# Patient Record
Sex: MALE | Race: WHITE | NOT HISPANIC OR LATINO | Employment: FULL TIME
[De-identification: names, ages, dates, MRNs, and addresses within clinical notes are randomized per-mention and may not be internally consistent; named-entity substitution may affect disease eponyms.]

---

## 2019-11-06 ENCOUNTER — HEALTH MAINTENANCE LETTER (OUTPATIENT)
Age: 24
End: 2019-11-06

## 2020-11-29 ENCOUNTER — HEALTH MAINTENANCE LETTER (OUTPATIENT)
Age: 25
End: 2020-11-29

## 2021-09-19 ENCOUNTER — HEALTH MAINTENANCE LETTER (OUTPATIENT)
Age: 26
End: 2021-09-19

## 2022-01-09 ENCOUNTER — HEALTH MAINTENANCE LETTER (OUTPATIENT)
Age: 27
End: 2022-01-09

## 2022-11-21 ENCOUNTER — HEALTH MAINTENANCE LETTER (OUTPATIENT)
Age: 27
End: 2022-11-21

## 2023-04-16 ENCOUNTER — HEALTH MAINTENANCE LETTER (OUTPATIENT)
Age: 28
End: 2023-04-16

## 2023-07-21 ENCOUNTER — APPOINTMENT (OUTPATIENT)
Dept: MRI IMAGING | Facility: CLINIC | Age: 28
End: 2023-07-21
Attending: EMERGENCY MEDICINE
Payer: COMMERCIAL

## 2023-07-21 ENCOUNTER — HOSPITAL ENCOUNTER (EMERGENCY)
Facility: CLINIC | Age: 28
Discharge: HOME | End: 2023-07-21
Attending: EMERGENCY MEDICINE | Admitting: EMERGENCY MEDICINE
Payer: COMMERCIAL

## 2023-07-21 VITALS
SYSTOLIC BLOOD PRESSURE: 122 MMHG | OXYGEN SATURATION: 96 % | HEART RATE: 62 BPM | DIASTOLIC BLOOD PRESSURE: 83 MMHG | BODY MASS INDEX: 37.03 KG/M2 | WEIGHT: 250 LBS | HEIGHT: 69 IN | TEMPERATURE: 98.8 F | RESPIRATION RATE: 16 BRPM

## 2023-07-21 DIAGNOSIS — M51.369 DDD (DEGENERATIVE DISC DISEASE), LUMBAR: ICD-10-CM

## 2023-07-21 DIAGNOSIS — M51.26 HNP (HERNIATED NUCLEUS PULPOSUS), LUMBAR: ICD-10-CM

## 2023-07-21 DIAGNOSIS — M54.41 ACUTE RIGHT-SIDED LOW BACK PAIN WITH RIGHT-SIDED SCIATICA: ICD-10-CM

## 2023-07-21 PROCEDURE — 250N000013 HC RX MED GY IP 250 OP 250 PS 637: Performed by: EMERGENCY MEDICINE

## 2023-07-21 PROCEDURE — 99284 EMERGENCY DEPT VISIT MOD MDM: CPT | Mod: 25 | Performed by: EMERGENCY MEDICINE

## 2023-07-21 PROCEDURE — 99284 EMERGENCY DEPT VISIT MOD MDM: CPT | Performed by: EMERGENCY MEDICINE

## 2023-07-21 PROCEDURE — 72148 MRI LUMBAR SPINE W/O DYE: CPT

## 2023-07-21 PROCEDURE — 250N000012 HC RX MED GY IP 250 OP 636 PS 637: Performed by: EMERGENCY MEDICINE

## 2023-07-21 RX ORDER — METHYLPREDNISOLONE 4 MG
TABLET, DOSE PACK ORAL
Qty: 21 TABLET | Refills: 0 | Status: SHIPPED | OUTPATIENT
Start: 2023-07-21

## 2023-07-21 RX ORDER — PREDNISONE 20 MG/1
TABLET ORAL
Qty: 10 TABLET | Refills: 0 | Status: SHIPPED | OUTPATIENT
Start: 2023-07-21

## 2023-07-21 RX ORDER — PREDNISONE 20 MG/1
40 TABLET ORAL ONCE
Status: COMPLETED | OUTPATIENT
Start: 2023-07-21 | End: 2023-07-21

## 2023-07-21 RX ORDER — PREDNISONE 20 MG/1
40 TABLET ORAL ONCE
Status: CANCELLED | OUTPATIENT
Start: 2023-07-21 | End: 2023-07-21

## 2023-07-21 RX ORDER — CYCLOBENZAPRINE HCL 10 MG
10 TABLET ORAL 3 TIMES DAILY PRN
Qty: 30 TABLET | Refills: 0 | Status: SHIPPED | OUTPATIENT
Start: 2023-07-21

## 2023-07-21 RX ORDER — ACETAMINOPHEN 325 MG/1
975 TABLET ORAL ONCE
Status: COMPLETED | OUTPATIENT
Start: 2023-07-21 | End: 2023-07-21

## 2023-07-21 RX ORDER — CYCLOBENZAPRINE HCL 10 MG
10 TABLET ORAL 3 TIMES DAILY PRN
Qty: 30 TABLET | Refills: 1 | Status: SHIPPED | OUTPATIENT
Start: 2023-07-21

## 2023-07-21 RX ADMIN — ACETAMINOPHEN 975 MG: 325 TABLET, FILM COATED ORAL at 20:02

## 2023-07-21 RX ADMIN — PREDNISONE 40 MG: 20 TABLET ORAL at 23:11

## 2023-07-21 ASSESSMENT — ACTIVITIES OF DAILY LIVING (ADL)
ADLS_ACUITY_SCORE: 33
ADLS_ACUITY_SCORE: 35
ADLS_ACUITY_SCORE: 33

## 2023-07-21 NOTE — ED TRIAGE NOTES
States having on and off back pain. Went to the chiropractor and after adjustment now having a lot of pain lower back

## 2023-07-22 NOTE — ED NOTES
Pt states he took ibuprofen at 1600, declined tylenol, states he can bear weight on L leg, unable to bear weight on R leg.

## 2023-07-22 NOTE — ED PROVIDER NOTES
History     Chief Complaint   Patient presents with     Back Pain     HPI   History per patient, review of [facility] EMR and Care Everywhere EMR, including review of prior clinic notes, CT spine imaging evaluation and laboratory evaluation.  Hector Rendon is a 28 year old male with chronic LBP with acute severe LBP after chiropractic care this afternoon, while in the chiropractor's office after manipulation.  He has had several days of atraumatic LBP consistent with prior frequent acute flares of low back pain, prompting him to seek chiropractic care on 2 occasions this past week.  He currently has sharp severe right low back pain limiting range of motion and ability to ambulate with radiation of pain into the right hip area and leg to the level of the knee but no further.  No leg weakness but is unable to use the right leg normally due to severe back pain caused by movement of the right leg and hip.  No bowel or bladder incontinence.  No flank pain or abdominal pain.  No fever or chills.  No other pertinent history or acute complaints or concerns.      Allergies:  Allergies   Allergen Reactions     Keflex [Cephalexin Monohydrate]        Problem List:    Patient Active Problem List    Diagnosis Date Noted     Marijuana smoker 04/11/2013     Priority: Medium     Acute ulcerative gingivitis 08/07/2008     Priority: Medium     on left lower gum - herpes? send cx.  1st episode.         Family history of ischemic heart disease 08/07/2008     Priority: Medium      - check lipids       Bronchospasm 08/07/2008     Priority: Medium     episode waking him up - chest heaviness w exercise - methacholine challenge test - hold on sports for now - consider cotnroller med pending tests       Health Care Home 04/10/2013     Priority: Low     EMERGENCY CARE PLAN  April 10, 2013: No current Care Coordination follow up planned. Please refer if Care Coordination services are needed.    Presenting Problem Signs and Symptoms Treatment  Plan   Questions or concerns   during clinic hours   I will call my clinic directly:  [facility]  [address] [place] [address]  [phone].    Questions or concerns outside clinic hours   I will call the 24 hour nurse line at   [phone] or [phone].   Need to schedule an appointment   I will call the 24 hour scheduling team at [phone] or my clinic directly at [phone].    Same day treatment     I will call my clinic first, nurse line if after hours, urgent care and express care if needed.   Clinic care coordination services (regular clinic hours)     I will call a clinic care coordinator directly:     Jensen Mae RN  Mon, Tues, Fri - [phone]  Wed, Thurs - [phone]    Montse Heath :    [phone]    Or call my clinic at [phone] and ask to speak with care coordination.   Crisis Services: Behavioral or Mental Health  [facility] 24 Hour Phone Line  [phone]    [facility] 24 Hour Crisis Services  [phone]    [facility] ([facility]) Network [phone]    [facility]   [phone]       Emergency treatment -- Immediately    CAll 205             Past Medical History:    No past medical history on file.    Past Surgical History:    Past Surgical History:   Procedure Laterality Date     DENTAL SURGERY      [place] teeth removal     Left little finger      Wood splitter accident       Family History:    Family History   Problem Relation Age of Onset     Hypertension Father      Cancer Maternal Grandmother         Lung     Cancer - colorectal Maternal Grandfather          70's     Heart Disease Maternal Grandfather          70's     Cerebrovascular Disease Paternal Grandmother         Lymphoma     Lipids Mother      Hypertension Mother      Heart Disease Other         MI at 46 yo.  mggf     Heart Disease Paternal Grandfather         74 yo heart       Social History:  Marital Status:  Single [1]  Social History  "    Tobacco Use     Smoking status: Every Day     Packs/day: 0.25     Years: 5.00     Pack years: 1.25     Types: Cigarettes     Smokeless tobacco: Never   Substance Use Topics     Alcohol use: No     Drug use: Yes     Comment: Marijuana        Medications:    cyclobenzaprine (FLEXERIL) 10 MG tablet  cyclobenzaprine (FLEXERIL) 10 MG tablet  methylPREDNISolone (MEDROL DOSEPAK) 4 MG tablet therapy pack  predniSONE (DELTASONE) 20 MG tablet  NO ACTIVE MEDICATIONS        Review of Systems  As mentioned in the HPI, in addition focused review of systems was negative.    Physical Exam   BP: (!) 150/96  Pulse: 81  Temp: 98.8  F (37.1  C)  Resp: 16  Height: 175.3 cm (5' 9\")  Weight: 113.4 kg (250 lb)  SpO2: 98 %      Physical Exam  Vitals and nursing note reviewed.   Constitutional:       General: He is in acute distress.      Appearance: Normal appearance. He is well-developed. He is not ill-appearing or diaphoretic.   HENT:      Head: Normocephalic and atraumatic.   Eyes:      General: No scleral icterus.     Extraocular Movements: Extraocular movements intact.      Conjunctiva/sclera: Conjunctivae normal.   Neck:      Trachea: No tracheal deviation.   Cardiovascular:      Rate and Rhythm: Normal rate and regular rhythm.      Pulses: Normal pulses.   Pulmonary:      Effort: Pulmonary effort is normal. No respiratory distress.   Abdominal:      General: There is no distension.      Tenderness: There is no abdominal tenderness. There is no right CVA tenderness or left CVA tenderness.   Musculoskeletal:         General: Tenderness present. No swelling. Injury:  right lumbar back.      Cervical back: Normal, normal range of motion and neck supple. No tenderness.      Thoracic back: Normal. No bony tenderness.      Lumbar back: Spasms and tenderness present. No swelling, edema or bony tenderness. Decreased range of motion.        Back:       Right lower leg: No edema.      Left lower leg: No edema.   Skin:     General: Skin is " warm and dry.      Coloration: Skin is not pale.      Findings: No erythema or rash.   Neurological:      General: No focal deficit present.      Mental Status: He is alert and oriented to person, place, and time.      Sensory: Sensation is intact. No sensory deficit.      Motor: Motor function is intact. No weakness.      Coordination: Coordination is intact.      Deep Tendon Reflexes: Reflexes are normal and symmetric. Reflexes normal.   Psychiatric:         Mood and Affect: Mood normal.         Behavior: Behavior normal.         ED Course                 Procedures            Results for orders placed or performed during the hospital encounter of 07/21/23   MR Lumbar Spine w/o Contrast     Status: None    Narrative    EXAM: MR LUMBAR SPINE W/O CONTRAST  LOCATION: [facility]  DATE: 7/21/2023    INDICATION: Acute on chronic LBP radiating to the right leg after chiropractic care.  COMPARISON: None.  TECHNIQUE: Routine Lumbar Spine MRI without IV contrast.    FINDINGS:   Nomenclature is based on 5 lumbar type vertebral bodies. Straightening of the normal lordosis without significant spondylolisthesis. Maintained vertebral body heights. Intervertebral disc height loss and desiccation at L5-S1 with mild Modic type I   degenerative change. Mild nonspecific interspinous edema at L5-S1 and, to a lesser extent, at L3-L5. No suspicious bone marrow signal intensity. Unremarkable conus medullaris terminating at T12-L1. Unremarkable cauda equina nerve roots. No significant   abnormality within the visualized abdominopelvic cavity. Unremarkable partially imaged sacroiliac joints.    T12-L1: No significant degenerative change, spinal canal stenosis, or foraminal narrowing.     L1-L2: No significant degenerative change, spinal canal stenosis, or foraminal narrowing.    L2-L3: No significant degenerative change, spinal canal stenosis, or foraminal narrowing.     L3-L4: No significant degenerative  change, spinal canal stenosis, or foraminal narrowing.    L4-L5: Mild right facet arthrosis. No significant disc degenerative change, spinal canal stenosis, or foraminal narrowing.    L5-S1: A disc bulge with a superimposed central disc protrusion and annular fissure contacting the descending LEFT S1 nerve root without significant spinal canal stenosis. Along with mild bilateral facet arthrosis, findings contribute to mild left   foraminal narrowing. No significant right foraminal stenosis.      Impression    IMPRESSION:  1.  A disc bulge with a superimposed central disc protrusion and annular fissure at L5-S1 contacting the descending LEFT S1 nerve root without significant spinal canal stenosis.  2.  Mild left L5-S1 foraminal narrowing.  3.  Mild Modic type I degenerative change at L5-S1, which may correlate with symptoms of back pain.         Medications   HYDROmorphone (DILAUDID) injection 1 mg (1 mg Intramuscular Not Given 7/21/23 1959)   acetaminophen (TYLENOL) tablet 975 mg (975 mg Oral $Given 7/21/23 2002)   predniSONE (DELTASONE) tablet 40 mg (40 mg Oral $Given 7/21/23 2311)     Much improved after Dilaudid IM.    Assessments & Plan (with Medical Decision Making)   28 year old male with chronic LBP with acute severe LBP after chiropractic care this afternoon, while in the chiropractor's office after manipulation.  He has had several days of atraumatic LBP consistent with prior frequent acute flares of low back pain, prompting him to seek chiropractic care on 2 occasions this past week.  He currently has sharp severe right low back pain limiting range of motion and ability to ambulate with radiation of pain into the right hip area and leg to the level of the knee but no further.  No leg weakness or sensory deficit.  No evidence of cauda equina syndrome or emergent disease process. MRI lumbar spine shows a disc bulge with superimposed central disc protrusion and annular fissure at L5-S1 which contacts the  descending left, but not right, S1 nerve root without significant spinal canal stenosis.  Much improved after Dilaudid IM. He declined rx for an opiate analgesic.  I prescribed Flexeril and he will use OTC analgesics, Tylenol and ibuprofen.  He wished to try prednisone and he was given a dose prior to discharge with Rx for Medrol Dosepak.  I will make an Orthopedic/Spine  referral for his follow-up and have him follow-up in primary care clinic.  He was provided instructions for supportive care and will return as needed for worsened condition or worsening symptoms, or new problems or concerns.    I have reviewed the nursing notes.    I have reviewed the findings, diagnosis, plan and need for follow up with the patient.    Medical Decision Making: Moderate complexity.      Discharge Medication List as of 7/21/2023 11:04 PM      START taking these medications    Details   !! cyclobenzaprine (FLEXERIL) 10 MG tablet Take 1 tablet (10 mg) by mouth 3 times daily as needed for muscle spasms, Disp-30 tablet, R-0, E-Prescribe      !! cyclobenzaprine (FLEXERIL) 10 MG tablet Take 1 tablet (10 mg) by mouth 3 times daily as needed for muscle spasms, Disp-30 tablet, R-1, E-Prescribe      methylPREDNISolone (MEDROL DOSEPAK) 4 MG tablet therapy pack Follow Package Directions, Disp-21 tablet, R-0, E-Prescribe      predniSONE (DELTASONE) 20 MG tablet Take two tablets (40mg) daily for 6 days, Disp-10 tablet, R-0, E-Prescribe       !! - Potential duplicate medications found. Please discuss with provider.          Final diagnoses:   Acute right-sided low back pain with right-sided sciatica   DDD (degenerative disc disease), lumbar   HNP (herniated nucleus pulposus), lumbar - L5-S1       7/21/2023   [facility] EMERGENCY DEPT       Robb Sunshine MD  07/24/23 2129

## 2023-07-25 ENCOUNTER — OFFICE VISIT (OUTPATIENT)
Dept: PHYSICAL MEDICINE AND REHAB | Facility: CLINIC | Age: 28
End: 2023-07-25
Attending: EMERGENCY MEDICINE
Payer: COMMERCIAL

## 2023-07-25 VITALS
HEART RATE: 83 BPM | BODY MASS INDEX: 37.03 KG/M2 | HEIGHT: 69 IN | SYSTOLIC BLOOD PRESSURE: 145 MMHG | DIASTOLIC BLOOD PRESSURE: 87 MMHG | WEIGHT: 250 LBS

## 2023-07-25 DIAGNOSIS — M51.26 HNP (HERNIATED NUCLEUS PULPOSUS), LUMBAR: ICD-10-CM

## 2023-07-25 DIAGNOSIS — M51.369 DDD (DEGENERATIVE DISC DISEASE), LUMBAR: ICD-10-CM

## 2023-07-25 DIAGNOSIS — M54.50 ACUTE BILATERAL LOW BACK PAIN WITHOUT SCIATICA: Primary | ICD-10-CM

## 2023-07-25 DIAGNOSIS — M54.41 ACUTE RIGHT-SIDED LOW BACK PAIN WITH RIGHT-SIDED SCIATICA: ICD-10-CM

## 2023-07-25 PROCEDURE — 99204 OFFICE O/P NEW MOD 45 MIN: CPT | Performed by: NURSE PRACTITIONER

## 2023-07-25 ASSESSMENT — PAIN SCALES - GENERAL: PAINLEVEL: MODERATE PAIN (5)

## 2023-07-25 NOTE — PROGRESS NOTES
ASSESSMENT: Hector Rendon is a 28 year old male who presents for consultation at the request of PCP Clinic, [facility], who presents today for new patient evaluation of:    -low back pain     Patient is neurologically intact on exam. No myelopathic or red flag symptoms.  He has no consistent leg symptoms at this time, however leg extension does worsen his back pain.  Recommended starting a course of physical therapy.  Recommended smoking cessation.  Recommended continuing and finishing his previously prescribed course of prednisone from the ER.  Recommended continuing his 3 times daily Flexeril 10 mg.  Recommended avoidance of exacerbating activities until symptoms begin to improve.  I will ask one of the nurses to reach out to him in several days to see how he is doing since he is scheduled to return to work 6 days from now.  Would consider a lifting restriction of no more than 10 pounds if still having difficulty with physical activity at that point.  If he continues to slowly improve, recommend next follow-up appointment in approximately 8 weeks after finished with full course of physical therapy.  If symptoms worsen in the meantime, recommended he call our nurse line.  We reviewed red flag symptoms for which he should call us.         No data to display                     Diagnoses and all orders for this visit:  Acute bilateral low back pain without sciatica  -     Physical Therapy Referral; Future  Acute right-sided low back pain with right-sided sciatica  -     Spine  Referral  DDD (degenerative disc disease), lumbar  -     Spine  Referral  HNP (herniated nucleus pulposus), lumbar  Comments:  L5-S1  Orders:  -     Spine  Referral      PLAN:  Reviewed spine anatomy and disease process. Discussed diagnosis and treatment options with the patient today. A shared decision making model was used.  The patient's values and choices were respected. The following represents what was  discussed and decided upon by the provider and the patient.      -DIAGNOSTIC TESTS:  Images were personally reviewed and interpreted and explained to patient today using a spine model.   --I did not order any additional diagnostic testing today    -PHYSICAL THERAPY:   -I referred Hector to physical therapy today  -I recommended he ask his chiropractor for their opinion on whether to continue care given his MRI results. I do not see any contraindications to continuing it at this time.  Discussed the importance of core strengthening, ROM, stretching exercises with the patient and how each of these entities is important in decreasing pain.  Explained to the patient that the purpose of physical therapy is to teach the patient a home exercise program.  These exercises need to be performed every day in order to decrease pain and prevent future occurrences of pain.        -MEDICATIONS:    -Recommended he continue and finish his course of prednisone 40 mg daily.  Recommend he continue his Flexeril 10 mg 3 times daily as needed.  Recommended over-the-counter Tylenol as needed as directed.  -If patient has residual back pain after completion of course of steroids, I would consider prescribing ibuprofen for him to continue on an as-needed basis for pain control.  -We could trial a different muscle relaxer if he feels he has little benefit from Flexeril.  We talked about switching his muscle relaxer as soon as today, but patient preferred to continue on his current Flexeril.  -I do not feel a neuropathic agent would benefit his pain at this point, given that he has no consistent leg symptoms.  -Of note, duplicate prescriptions for Flexeril and what seems like an erroneous entry for Medrol Dosepak are in the chart for his recent ER visit.  He only picked up 1 prescription of Flexeril and prescription for prednisone.  Discussed multiple medication options today with patient. Discussed risks, side effects, and proper use of  medications. Patient verbalized understanding.    -INTERVENTIONS:  Discussed risks and benefits of injections with patient today.  He would be a good candidate for a lumbar epidural steroid injection if he fails to respond to treatment with physical therapy and oral medications.    -PATIENT EDUCATION:  Total time of 30 minutes, on the day of service, spent with the patient, reviewing the chart, placing orders, and documenting.   -Today we also discussed the pros and cons of the current treatment plan.    -FOLLOW-UP:   Follow-up nurse telephone call this Friday.  If overall improving, would see patient back in approximately 8 weeks after completion of full course of physical therapy    Advised patient to call the [facility] if symptoms worsen, new numbness or weakness develops in the legs, or if they develop new or worsening problems controlling bladder or bowel function.   ______________________________________________________________________    SUBJECTIVE:    HPI:  Hector Rendon  Is a 28 year old male who presents today for new patient evaluation of low back pain.    Patient endorses a history of lower back pain for many years.  Symptoms have historically flared up from time to time, and resolved with chiropractic adjustments.  Last week, his back pain flared up and he pursued chiropractic treatment per usual.  He went in on Wednesday to get an adjustment and was feeling better.  Then on Thursday, he felt another twinge of pain and booked another appointment for Friday.  Meanwhile he was taking over-the-counter Advil 600 mg every 6 hours and Tylenol. He went to the chiropractor on Friday, and his back pain started immediately following adjustment. He apparently had to be carried to his truck.  The pain was sharp, radiated straight across his lower back. He couldn't walk, move, or stand up. He drove home, he got home and called his parents and was driven directly to the ED.    Once at the hospital.  He was  given IV Dilaudid.  An MRI of his lumbar spine was performed.  He was told he had a lumbar disc herniation and degenerative disc disease.  He was prescribed cyclobenzaprine and prednisone.  Since going to the ER, he has taken 1 dose of cyclobenzaprine per day.  It has not seemed very helpful.  He picked up the prednisone prescription yesterday and took 1 dose yesterday, which did not seem to help.  Today, he is feeling somewhat better. He is finally able to walk.  He is able to go up and down stairs, but it is painful. Transitions from lying to sitting to standing are still difficult.  He feels unable to stand up straight.  He is wearing a lumbar support belt that he got on [employer], which seems to help.     Still, if he twists funny or lifts something in an awkward position, he gets lightning pain across the lower back and down the backs of both legs to the knees, and he then goes down to the ground.  He does not have leg pain otherwise.    Pain today is a 7 out of 10, pain at best is 3 out of 10, pain at worst is 10 out of 10.  He has not taken the Flexeril or prednisone yet today.     -Treatment to Date:     -Medications:  -flexeril  -Prednisone  -Over-the-counter ibuprofen  -Over-the-counter Tylenol    Current Outpatient Medications   Medication    cyclobenzaprine (FLEXERIL) 10 MG tablet    predniSONE (DELTASONE) 20 MG tablet    cyclobenzaprine (FLEXERIL) 10 MG tablet    methylPREDNISolone (MEDROL DOSEPAK) 4 MG tablet therapy pack    NO ACTIVE MEDICATIONS     No current facility-administered medications for this visit.       Allergies   Allergen Reactions    Keflex [Cephalexin Monohydrate]        No past medical history on file.     Patient Active Problem List   Diagnosis    Acute ulcerative gingivitis    Family history of ischemic heart disease    Bronchospasm    Health Care Home    Marijuana smoker       Past Surgical History:   Procedure Laterality Date    DENTAL SURGERY      [place] teeth removal    Left little  "finger      Wood splitter accident       Family History   Problem Relation Age of Onset    Hypertension Father     Cancer Maternal Grandmother         Lung    Cancer - colorectal Maternal Grandfather          70's    Heart Disease Maternal Grandfather          70's    Cerebrovascular Disease Paternal Grandmother         Lymphoma    Lipids Mother     Hypertension Mother     Heart Disease Other         MI at 46 yo.  mggf    Heart Disease Paternal Grandfather         76 yo heart       Reviewed past medical, surgical, and family history with patient found on new patient intake packet located in EMR Media tab.     SOCIAL HX: Smoker, chewing tobacco, no alcohol use, no recreational drug use.    ROS: Positive for joint pain, muscle pain, muscle fatigue.  Specifically negative for bowel/bladder dysfunction, balance changes, headache, dizziness, foot drop, fevers, chills, appetite changes, nausea/vomiting, unexplained weight loss. Otherwise 13 systems reviewed are negative. Please see the patient's intake questionnaire from today for details.    OBJECTIVE:  BP (!) 145/87   Pulse 83   Ht 5' 9\" (1.753 m)   Wt 250 lb (113.4 kg)   BMI 36.92 kg/m      PHYSICAL EXAMINATION:    --CONSTITUTIONAL:  Vital signs as above.  No acute distress.  The patient is well nourished and well groomed.  --PSYCHIATRIC:  Appropriate mood and affect. The patient is awake, alert, oriented to person, place, time and answering questions appropriately with clear speech.    --SKIN:  Skin over the face, bilateral lower extremities, and posterior torso is clean, dry, intact without rashes.    --RESPIRATORY: Normal rhythm and effort. No abnormal accessory muscle breathing patterns noted.   --ABDOMINAL:  Non-distended.    --GROSS MOTOR: Gait is stiff and careful.  Painfully arises from a seated position. Toe walking and heel walking are normal without significant difficulty.  Tandem gait is normal     --LOWER EXTREMITY MOTOR TESTING:  Hip flexion: " right 5/5, left 5/5  Quads: right 5/5, left 5/5  Hamstrings: right 5/5, left 5/5  Dorsiflexion: right 5/5, left 5/5  Plantar flexion: right 5/5, left 5/5    Great toe MTP extension/EHL: right 5/5, left 5/5    --NEUROLOGICAL:  2/4 patellar and achilles reflexes bilaterally. Sensation to light touch is intact throughout both lower extremities. Babinski is negative. No clonus.    --STANDING EXAMINATION:  Normal lumbar lordosis noted, no lateral shift.    --MUSCULOSKELETAL: Lumbar spine inspection reveals no evidence of deformity. Range of motion is limited in lumbar flexion, extension, and lateral rotation due to pain. No point tenderness to palpation lumbar spine. No paraspinal musculature tenderness. Straight leg raising is positive bilaterally    --HIPS: Full range of motion bilaterally.  Negative hip joint tenderness to palp.    --SACROILIAC JOINT: No tenderness to palpation of SI joints.  One finger point test was negative.    --VASCULAR:  2/4 dorsalis pedis and posterior tibialsi pulses bilaterally.  Bilateral lower extremities are warm without any discoloration.  There is no pitting edema of the bilateral lower extremities.    RESULTS:   Prior medical records from [facility] Everywhere were reviewed today.    Imaging: Spine imaging was personally reviewed and interpreted today. The images were shown to the patient and the findings were explained using a spine model.      MR Lumbar Spine w/o Contrast    Result Date: 7/21/2023  EXAM: MR LUMBAR SPINE W/O CONTRAST LOCATION: [facility] DATE: 7/21/2023 INDICATION: Acute on chronic LBP radiating to the right leg after chiropractic care. COMPARISON: None. TECHNIQUE: Routine Lumbar Spine MRI without IV contrast. FINDINGS: Nomenclature is based on 5 lumbar type vertebral bodies. Straightening of the normal lordosis without significant spondylolisthesis. Maintained vertebral body heights. Intervertebral disc height loss and  desiccation at L5-S1 with mild Modic type I degenerative change. Mild nonspecific interspinous edema at L5-S1 and, to a lesser extent, at L3-L5. No suspicious bone marrow signal intensity. Unremarkable conus medullaris terminating at T12-L1. Unremarkable cauda equina nerve roots. No significant abnormality within the visualized abdominopelvic cavity. Unremarkable partially imaged sacroiliac joints. T12-L1: No significant degenerative change, spinal canal stenosis, or foraminal narrowing. L1-L2: No significant degenerative change, spinal canal stenosis, or foraminal narrowing. L2-L3: No significant degenerative change, spinal canal stenosis, or foraminal narrowing. L3-L4: No significant degenerative change, spinal canal stenosis, or foraminal narrowing. L4-L5: Mild right facet arthrosis. No significant disc degenerative change, spinal canal stenosis, or foraminal narrowing. L5-S1: A disc bulge with a superimposed central disc protrusion and annular fissure contacting the descending LEFT S1 nerve root without significant spinal canal stenosis. Along with mild bilateral facet arthrosis, findings contribute to mild left foraminal narrowing. No significant right foraminal stenosis.     IMPRESSION: 1.  A disc bulge with a superimposed central disc protrusion and annular fissure at L5-S1 contacting the descending LEFT S1 nerve root without significant spinal canal stenosis. 2.  Mild left L5-S1 foraminal narrowing. 3.  Mild Modic type I degenerative change at L5-S1, which may correlate with symptoms of back pain.          Aruna Jackson FNP-C  [facility]  O. [phone]

## 2023-07-25 NOTE — LETTER
7/25/2023         RE: Hector Rendon  [address]  [facility] [address]        Dear Colleague,    Thank you for referring your patient, Hector Rendon, to the [facility]. Please see a copy of my visit note below.    ASSESSMENT: Hector Rendon is a 28 year old male who presents for consultation at the request of PCP Clinic, [facility], who presents today for new patient evaluation of:    -low back pain     Patient is neurologically intact on exam. No myelopathic or red flag symptoms.  He has no consistent leg symptoms at this time, however leg extension does worsen his back pain.  Recommended starting a course of physical therapy.  Recommended smoking cessation.  Recommended continuing and finishing his previously prescribed course of prednisone from the ER.  Recommended continuing his 3 times daily Flexeril 10 mg.  Recommended avoidance of exacerbating activities until symptoms begin to improve.  I will ask one of the nurses to reach out to him in several days to see how he is doing since he is scheduled to return to work 6 days from now.  Would consider a lifting restriction of no more than 10 pounds if still having difficulty with physical activity at that point.  If he continues to slowly improve, recommend next follow-up appointment in approximately 8 weeks after finished with full course of physical therapy.  If symptoms worsen in the meantime, recommended he call our nurse line.  We reviewed red flag symptoms for which he should call us.         No data to display                     Diagnoses and all orders for this visit:  Acute bilateral low back pain without sciatica  -     Physical Therapy Referral; Future  Acute right-sided low back pain with right-sided sciatica  -     Spine  Referral  DDD (degenerative disc disease), lumbar  -     Spine  Referral  HNP (herniated nucleus pulposus), lumbar  Comments:  L5-S1  Orders:  -     Spine   Referral      PLAN:  Reviewed spine anatomy and disease process. Discussed diagnosis and treatment options with the patient today. A shared decision making model was used.  The patient's values and choices were respected. The following represents what was discussed and decided upon by the provider and the patient.      -DIAGNOSTIC TESTS:  Images were personally reviewed and interpreted and explained to patient today using a spine model.   --I did not order any additional diagnostic testing today    -PHYSICAL THERAPY:   -I referred Hector to physical therapy today  -I recommended he ask his chiropractor for their opinion on whether to continue care given his MRI results. I do not see any contraindications to continuing it at this time.  Discussed the importance of core strengthening, ROM, stretching exercises with the patient and how each of these entities is important in decreasing pain.  Explained to the patient that the purpose of physical therapy is to teach the patient a home exercise program.  These exercises need to be performed every day in order to decrease pain and prevent future occurrences of pain.        -MEDICATIONS:    -Recommended he continue and finish his course of prednisone 40 mg daily.  Recommend he continue his Flexeril 10 mg 3 times daily as needed.  Recommended over-the-counter Tylenol as needed as directed.  -If patient has residual back pain after completion of course of steroids, I would consider prescribing ibuprofen for him to continue on an as-needed basis for pain control.  -We could trial a different muscle relaxer if he feels he has little benefit from Flexeril.  We talked about switching his muscle relaxer as soon as today, but patient preferred to continue on his current Flexeril.  -I do not feel a neuropathic agent would benefit his pain at this point, given that he has no consistent leg symptoms.  -Of note, duplicate prescriptions for Flexeril and what seems like an  erroneous entry for Medrol Dosepak are in the chart for his recent ER visit.  He only picked up 1 prescription of Flexeril and prescription for prednisone.  Discussed multiple medication options today with patient. Discussed risks, side effects, and proper use of medications. Patient verbalized understanding.    -INTERVENTIONS:  Discussed risks and benefits of injections with patient today.  He would be a good candidate for a lumbar epidural steroid injection if he fails to respond to treatment with physical therapy and oral medications.    -PATIENT EDUCATION:  Total time of 30 minutes, on the day of service, spent with the patient, reviewing the chart, placing orders, and documenting.   -Today we also discussed the pros and cons of the current treatment plan.    -FOLLOW-UP:   Follow-up nurse telephone call this Friday.  If overall improving, would see patient back in approximately 8 weeks after completion of full course of physical therapy    Advised patient to call the Spine Center if symptoms worsen, new numbness or weakness develops in the legs, or if they develop new or worsening problems controlling bladder or bowel function.   ______________________________________________________________________    SUBJECTIVE:    HPI:  Hector Rendon  Is a 28 year old male who presents today for new patient evaluation of low back pain.    Patient endorses a history of lower back pain for many years.  Symptoms have historically flared up from time to time, and resolved with chiropractic adjustments.  Last week, his back pain flared up and he pursued chiropractic treatment per usual.  He went in on Wednesday to get an adjustment and was feeling better.  Then on Thursday, he felt another twinge of pain and booked another appointment for Friday.  Meanwhile he was taking over-the-counter Advil 600 mg every 6 hours and Tylenol. He went to the chiropractor on Friday, and his back pain started immediately following adjustment. He  apparently had to be carried to his truck.  The pain was sharp, radiated straight across his lower back. He couldn't walk, move, or stand up. He drove home, he got home and called his parents and was driven directly to the ED.    Once at the hospital.  He was given IV Dilaudid.  An MRI of his lumbar spine was performed.  He was told he had a lumbar disc herniation and degenerative disc disease.  He was prescribed cyclobenzaprine and prednisone.  Since going to the ER, he has taken 1 dose of cyclobenzaprine per day.  It has not seemed very helpful.  He picked up the prednisone prescription yesterday and took 1 dose yesterday, which did not seem to help.  Today, he is feeling somewhat better. He is finally able to walk.  He is able to go up and down stairs, but it is painful. Transitions from lying to sitting to standing are still difficult.  He feels unable to stand up straight.  He is wearing a lumbar support belt that he got on [employer], which seems to help.     Still, if he twists funny or lifts something in an awkward position, he gets lightning pain across the lower back and down the backs of both legs to the knees, and he then goes down to the ground.  He does not have leg pain otherwise.    Pain today is a 7 out of 10, pain at best is 3 out of 10, pain at worst is 10 out of 10.  He has not taken the Flexeril or prednisone yet today.     -Treatment to Date:     -Medications:  -flexeril  -Prednisone  -Over-the-counter ibuprofen  -Over-the-counter Tylenol    Current Outpatient Medications   Medication     cyclobenzaprine (FLEXERIL) 10 MG tablet     predniSONE (DELTASONE) 20 MG tablet     cyclobenzaprine (FLEXERIL) 10 MG tablet     methylPREDNISolone (MEDROL DOSEPAK) 4 MG tablet therapy pack     NO ACTIVE MEDICATIONS     No current facility-administered medications for this visit.       Allergies   Allergen Reactions     Keflex [Cephalexin Monohydrate]        No past medical history on file.     Patient Active  "Problem List   Diagnosis     Acute ulcerative gingivitis     Family history of ischemic heart disease     Bronchospasm     Health Care Home     Marijuana smoker       Past Surgical History:   Procedure Laterality Date     DENTAL SURGERY      [place] teeth removal     Left little finger      Wood splitter accident       Family History   Problem Relation Age of Onset     Hypertension Father      Cancer Maternal Grandmother         Lung     Cancer - colorectal Maternal Grandfather          70's     Heart Disease Maternal Grandfather          70's     Cerebrovascular Disease Paternal Grandmother         Lymphoma     Lipids Mother      Hypertension Mother      Heart Disease Other         MI at 44 yo.  mggf     Heart Disease Paternal Grandfather         76 yo heart       Reviewed past medical, surgical, and family history with patient found on new patient intake packet located in EMR Media tab.     SOCIAL HX: Smoker, chewing tobacco, no alcohol use, no recreational drug use.    ROS: Positive for joint pain, muscle pain, muscle fatigue.  Specifically negative for bowel/bladder dysfunction, balance changes, headache, dizziness, foot drop, fevers, chills, appetite changes, nausea/vomiting, unexplained weight loss. Otherwise 13 systems reviewed are negative. Please see the patient's intake questionnaire from today for details.    OBJECTIVE:  BP (!) 145/87   Pulse 83   Ht 5' 9\" (1.753 m)   Wt 250 lb (113.4 kg)   BMI 36.92 kg/m      PHYSICAL EXAMINATION:    --CONSTITUTIONAL:  Vital signs as above.  No acute distress.  The patient is well nourished and well groomed.  --PSYCHIATRIC:  Appropriate mood and affect. The patient is awake, alert, oriented to person, place, time and answering questions appropriately with clear speech.    --SKIN:  Skin over the face, bilateral lower extremities, and posterior torso is clean, dry, intact without rashes.    --RESPIRATORY: Normal rhythm and effort. No abnormal accessory muscle " breathing patterns noted.   --ABDOMINAL:  Non-distended.    --GROSS MOTOR: Gait is stiff and careful.  Painfully arises from a seated position. Toe walking and heel walking are normal without significant difficulty.  Tandem gait is normal     --LOWER EXTREMITY MOTOR TESTING:  Hip flexion: right 5/5, left 5/5  Quads: right 5/5, left 5/5  Hamstrings: right 5/5, left 5/5  Dorsiflexion: right 5/5, left 5/5  Plantar flexion: right 5/5, left 5/5    Great toe MTP extension/EHL: right 5/5, left 5/5    --NEUROLOGICAL:  2/4 patellar and achilles reflexes bilaterally. Sensation to light touch is intact throughout both lower extremities. Babinski is negative. No clonus.    --STANDING EXAMINATION:  Normal lumbar lordosis noted, no lateral shift.    --MUSCULOSKELETAL: Lumbar spine inspection reveals no evidence of deformity. Range of motion is limited in lumbar flexion, extension, and lateral rotation due to pain. No point tenderness to palpation lumbar spine. No paraspinal musculature tenderness. Straight leg raising is positive bilaterally    --HIPS: Full range of motion bilaterally.  Negative hip joint tenderness to palp.    --SACROILIAC JOINT: No tenderness to palpation of SI joints.  One finger point test was negative.    --VASCULAR:  2/4 dorsalis pedis and posterior tibialsi pulses bilaterally.  Bilateral lower extremities are warm without any discoloration.  There is no pitting edema of the bilateral lower extremities.    RESULTS:   Prior medical records from [facility] and [facility]where were reviewed today.    Imaging: Spine imaging was personally reviewed and interpreted today. The images were shown to the patient and the findings were explained using a spine model.      MR Lumbar Spine w/o Contrast    Result Date: 7/21/2023  EXAM: MR LUMBAR SPINE W/O CONTRAST LOCATION: [facility] DATE: 7/21/2023 INDICATION: Acute on chronic LBP radiating to the right leg after chiropractic care.  COMPARISON: None. TECHNIQUE: Routine Lumbar Spine MRI without IV contrast. FINDINGS: Nomenclature is based on 5 lumbar type vertebral bodies. Straightening of the normal lordosis without significant spondylolisthesis. Maintained vertebral body heights. Intervertebral disc height loss and desiccation at L5-S1 with mild Modic type I degenerative change. Mild nonspecific interspinous edema at L5-S1 and, to a lesser extent, at L3-L5. No suspicious bone marrow signal intensity. Unremarkable conus medullaris terminating at T12-L1. Unremarkable cauda equina nerve roots. No significant abnormality within the visualized abdominopelvic cavity. Unremarkable partially imaged sacroiliac joints. T12-L1: No significant degenerative change, spinal canal stenosis, or foraminal narrowing. L1-L2: No significant degenerative change, spinal canal stenosis, or foraminal narrowing. L2-L3: No significant degenerative change, spinal canal stenosis, or foraminal narrowing. L3-L4: No significant degenerative change, spinal canal stenosis, or foraminal narrowing. L4-L5: Mild right facet arthrosis. No significant disc degenerative change, spinal canal stenosis, or foraminal narrowing. L5-S1: A disc bulge with a superimposed central disc protrusion and annular fissure contacting the descending LEFT S1 nerve root without significant spinal canal stenosis. Along with mild bilateral facet arthrosis, findings contribute to mild left foraminal narrowing. No significant right foraminal stenosis.     IMPRESSION: 1.  A disc bulge with a superimposed central disc protrusion and annular fissure at L5-S1 contacting the descending LEFT S1 nerve root without significant spinal canal stenosis. 2.  Mild left L5-S1 foraminal narrowing. 3.  Mild Modic type I degenerative change at L5-S1, which may correlate with symptoms of back pain.          Aruna Jackson P-C  [facility]  O. [phone]             Again, thank you for allowing me to  participate in the care of your patient.        Sincerely,        BEV Pedersen CNP

## 2023-07-25 NOTE — PATIENT INSTRUCTIONS
"Continue your current prescription for prednisone until prescription is done.  Continue your current prescription for cyclobenzaprine three times daily as needed.  If your pain returns or if you still have residual pain after finishing your steroids, call our office and we will discuss a prescription NSAID, or resuming OTC ibuprofen as directed for pain as needed.    Radicular Pain    Radicular pain in either the arm or leg is usually from a bulging disc in the spine. A portion of the herniated disc may press against the nerves as the nerves exit the spine. This causes pain which is felt down the arm or leg. Other causes of radicular pain may include:  Fractures.  Heart disease.  Cancer.  An abnormal and usually degenerative state of the nervous system or nerves (neuropathy).    In most cases, radicular pain is treated without imaging unless symptoms do not start to improve. If that is the case, your provider may order a CT or MRI scan to determine the cause.     Nerves in the cervical spine (neck) may cause radicular pain into the outer shoulder and down the arm. It can spread down to the thumb and fingers. The symptoms vary depending on which nerve root has been affected. In most cases, radicular pain improves with conservative treatment such as physical therapy, cervical traction, medications, and epidural steroid injections. A program for neck rehabilitation with stretching and strengthening exercises is an important part of management. Treatment may take months, and surgery may be considered as a last resort if the symptoms do not improve.    Nerves in the lumbar spine (lower back) may cause radicular pain into the hip and down the leg. The commonly used term for this type of pain is \"sciatica\". Conservative treatment is also recommended for this problem. Most patients feel better after 2 to 4 weeks of rest and other supportive care. You should avoid bending, lifting, and all other activities which can make " your pain worse. Physical therapy, traction, medications, and epidural steroid injections can be good options to help with your recovery. A program for back injury rehabilitation with stretching and strengthening exercises is an important part of management. Surgery may be considered as a last resort if symptoms do not improve with conservative treatment.     You may take over-the-counter or prescription medicines for pain, discomfort, or fever as directed by your caregiver. Muscle relaxants may help by relieving more severe pain and spasm. Neuropathic medication (such as gabapentin, lyrica, or cymbalta) can help decrease your symptoms of nerve pain as well. Cold or massage can also give significant relief. Spinal manipulation is not recommended as it can increase the degree of disc protrusion. We do not recommend taking narcotic medication such as percocet, oxycodone, norco, dilaudid, or others unless pain is severe and not controlled with any other oral options.    Epidural steroid injections are often effective treatment for radicular pain. These injections deliver strong anti-inflammatory medicine to the area directly around the nerve root in the space between your back bones (vertebrae). Your provider can give you more information about steroid injections. These injections are most effective when given within two weeks of the onset of acute pain. You should see your provider for follow up care as recommended.       Importance of specialized Physical Therapy:     Today, we discussed the importance of core strengthening, ROM, and stretching exercises, and how each of these are key in decreasing pain.   The purpose of physical therapy is to teach patients a home exercise program individualized to them and their specific health concerns.  These exercises need to be performed every day in order to decrease pain and prevent future occurrences of pain.        ~[facility] have [facility] They will call you to schedule an appointment.      Scheduling phone number is [phone] for [facility], [place], or [place] location.  If you have not heard from the scheduling office within 2 business days, please call [phone] for ALL other locations.    Discussed the importance of core strengthening, ROM, stretching exercises and how each of these entities is important in decreasing pain and improving long term spine health.  The purpose of physical therapy is to teach you an individualized home exercise program.  These exercises need to be performed every day in order to decrease pain and prevent future occurrences of pain.                 In most cases, radicular pain is treated without imaging unless symptoms do not start to improve. If that is the case, your provider may order a CT or MRI scan to determine the cause.     SEEK IMMEDIATE MEDICAL CARE IF:  You develop increased pain, weakness, or numbness in your arm or leg.  You develop difficulty with bladder or bowel control.  You develop abdominal pain.    Document Released: 01/25/2006 Document Revised: 03/11/2013 Document Reviewed: 04/12/2010  Patient Information  2013 [employer].       A nurse will call you on FRIDAY to see how you are doing.  If you are not doing better, the nurse will relay this information so that further recommendations can be made.   Please do not hesitate to contact the clinic at [phone] if you have any questions/concerns, or any worsening of your pain prior to that time. You are also welcome to contact Aruna Jackson via [employer], but please be aware that responses to [employer] message may take 2-3 days due to the high volume of patients seen in clinic.       ~Please call our [facility] Nurse Navigation line [phone] with any questions or concerns about your treatment plan, if symptoms worsen and you would like to be seen urgently, or if you have any new or  worsening numbness, weakness, or problems controlling bladder and bowel function.  ~You are also welcome to contact Aruna Jackson via [employer], but please be aware that responses to [employer] message may take 2-3 days due to the high volume of patients seen in clinic.

## 2023-07-28 ENCOUNTER — TELEPHONE (OUTPATIENT)
Dept: PHYSICAL MEDICINE AND REHAB | Facility: CLINIC | Age: 28
End: 2023-07-28
Payer: COMMERCIAL

## 2023-07-28 NOTE — TELEPHONE ENCOUNTER
"Per request of PSP Aruna Jackson, APRN, CNP: \"Please call this patient on Friday to check and see how he's doing. He is supposed to return to work next Monday and we will decide if he feels better, or if he is going to need a work note with a 10lb lifting restriction.\"     Call placed. Left message to return call.   "

## 2023-08-10 ENCOUNTER — THERAPY VISIT (OUTPATIENT)
Dept: PHYSICAL THERAPY | Facility: CLINIC | Age: 28
End: 2023-08-10
Attending: NURSE PRACTITIONER
Payer: COMMERCIAL

## 2023-08-10 DIAGNOSIS — M54.42 ACUTE BACK PAIN WITH SCIATICA, LEFT: Primary | ICD-10-CM

## 2023-08-10 DIAGNOSIS — M54.50 ACUTE BILATERAL LOW BACK PAIN WITHOUT SCIATICA: ICD-10-CM

## 2023-08-10 PROCEDURE — 97161 PT EVAL LOW COMPLEX 20 MIN: CPT | Mod: GP | Performed by: PHYSICAL THERAPIST

## 2023-08-10 PROCEDURE — 97110 THERAPEUTIC EXERCISES: CPT | Mod: GP | Performed by: PHYSICAL THERAPIST

## 2023-08-10 NOTE — PROGRESS NOTES
PHYSICAL THERAPY EVALUATION  Type of Visit: Evaluation    See electronic medical record for Abuse and Falls Screening details.    Subjective       Presenting condition or subjective complaint: Herniated disc and prevention of future problems. Had acute LBP after chiropractic visit. Has had recurrent back pain for years. 2-3 times per year will have a flare that takes him out a few weeks at a time. Did do steroid course with improvement of symptoms.   Date of onset: 07/21/23    Relevant medical history:     Dates & types of surgery:      Prior diagnostic imaging/testing results: MRI   see chart  Prior therapy history for the same diagnosis, illness or injury: No      Prior Level of Function  Transfers:   Ambulation:   ADL:   IADL:     Living Environment  Social support: With a significant other or spouse   Type of home: [facility]   Stairs to enter the home: No       Ramp: No   Stairs inside the home: Yes 20 Is there a railing: Yes   Help at home: None  Equipment owned:       Employment: Yes [employer]/   Hobbies/Interests: Motorcycle riding    Patient goals for therapy: Prevent future pain slips and back aches    Pain assessment:  see objective     Objective   LUMBAR SPINE EVALUATION  PAIN: Pain Level at Rest: 0/10  Pain Level with Use: 3/10  Pain Location: lumbar spine and L SI, glute  INTEGUMENTARY (edema, incisions):   POSTURE:   GAIT:   Weightbearing Status:   Assistive Device(s):   Gait Deviations:   BALANCE/PROPRIOCEPTION:   WEIGHTBEARING ALIGNMENT:   NON-WEIGHTBEARING ALIGNMENT:    ROM:  Full flexion fingertips to floor, mild restriction in extension, SB fingertips to knee joint line. No radicular symptoms today recreated w/ ROM.    Hip ROM WNL.   PELVIC/SI SCREEN:   STRENGTH:  difficulty w/ TA engagement    MYOTOMES: WNL  DTR S:   CORD SIGNS:   DERMATOMES: WNL  NEURAL TENSION: Lumbar WNL  FLEXIBILITY:   LUMBAR/HIP Special Tests:    PELVIS/SI SPECIAL TESTS:   FUNCTIONAL TESTS:   PALPATION:   hypomobile L SI w/ PA mob. Increasde tone/tension L glutes  SPINAL SEGMENTAL CONCLUSIONS:       Assessment & Plan   CLINICAL IMPRESSIONS  Medical Diagnosis: Acute bilateral low back pain without sciatica (M54.50)  - Primary    Treatment Diagnosis: Low back pain   Impression/Assessment: Patient is a 28 year old male with low back pain complaints.  The following significant findings have been identified: Pain, Decreased ROM/flexibility, Decreased joint mobility, Decreased strength, and Impaired muscle performance. These impairments interfere with their ability to perform self care tasks, work tasks, recreational activities, driving , household mobility, and community mobility as compared to previous level of function.     Clinical Decision Making (Complexity):  Clinical Presentation: Stable/Uncomplicated  Clinical Presentation Rationale: based on medical and personal factors listed in PT evaluation  Clinical Decision Making (Complexity): Low complexity    PLAN OF CARE  Treatment Interventions:  Interventions: Manual Therapy, Neuromuscular Re-education, Therapeutic Activity, Therapeutic Exercise, Self-Care/Home Management    Long Term Goals     PT Goal 1  Goal Identifier: 1  Goal Description: Pt will demonstrate appropriate lifting mechanics for 20# floor to counter for work duties  Rationale: to maximize safety and independence within the home;to maximize safety and independence with performance of ADLs and functional tasks;to maximize safety and independence within the community;to maximize safety and independence with self cares  Target Date: 09/21/23      Frequency of Treatment: 1x/week  Duration of Treatment: 6 weeks    Recommended Referrals to Other Professionals:   Education Assessment:        Risks and benefits of evaluation/treatment have been explained.   Patient/Family/caregiver agrees with Plan of Care.     Evaluation Time:             Signing Clinician: Sakina Clarke, PT

## 2023-08-25 ENCOUNTER — THERAPY VISIT (OUTPATIENT)
Dept: PHYSICAL THERAPY | Facility: CLINIC | Age: 28
End: 2023-08-25
Payer: COMMERCIAL

## 2023-08-25 DIAGNOSIS — M54.42 ACUTE BACK PAIN WITH SCIATICA, LEFT: Primary | ICD-10-CM

## 2023-08-25 PROCEDURE — 97110 THERAPEUTIC EXERCISES: CPT | Mod: GP | Performed by: PHYSICAL THERAPIST

## 2023-08-31 ENCOUNTER — THERAPY VISIT (OUTPATIENT)
Dept: PHYSICAL THERAPY | Facility: CLINIC | Age: 28
End: 2023-08-31
Payer: COMMERCIAL

## 2023-08-31 DIAGNOSIS — M54.42 ACUTE BACK PAIN WITH SCIATICA, LEFT: Primary | ICD-10-CM

## 2023-08-31 PROCEDURE — 97110 THERAPEUTIC EXERCISES: CPT | Mod: GP | Performed by: PHYSICAL THERAPIST

## 2023-09-14 ENCOUNTER — THERAPY VISIT (OUTPATIENT)
Dept: PHYSICAL THERAPY | Facility: CLINIC | Age: 28
End: 2023-09-14
Payer: COMMERCIAL

## 2023-09-14 DIAGNOSIS — M54.42 ACUTE BACK PAIN WITH SCIATICA, LEFT: Primary | ICD-10-CM

## 2023-09-14 PROCEDURE — 97110 THERAPEUTIC EXERCISES: CPT | Mod: GP | Performed by: PHYSICAL THERAPIST

## 2023-09-14 PROCEDURE — 97530 THERAPEUTIC ACTIVITIES: CPT | Mod: GP | Performed by: PHYSICAL THERAPIST

## 2023-09-29 ENCOUNTER — THERAPY VISIT (OUTPATIENT)
Dept: PHYSICAL THERAPY | Facility: CLINIC | Age: 28
End: 2023-09-29
Payer: COMMERCIAL

## 2023-09-29 DIAGNOSIS — M54.42 ACUTE BACK PAIN WITH SCIATICA, LEFT: Primary | ICD-10-CM

## 2023-09-29 PROCEDURE — 97110 THERAPEUTIC EXERCISES: CPT | Mod: GP | Performed by: PHYSICAL THERAPIST

## 2023-11-28 PROBLEM — M54.42 ACUTE BACK PAIN WITH SCIATICA, LEFT: Status: RESOLVED | Noted: 2023-08-10 | Resolved: 2023-11-28

## 2023-11-28 NOTE — PROGRESS NOTES
DISCHARGE  Reason for Discharge: Patient chooses to discontinue therapy.  Patient has failed to schedule further appointments.    Equipment Issued: na    Discharge Plan: Patient to continue home program.    Referring Provider:  Aruna Jackson     09/29/23 0500   Appointment Info   Signing clinician's name / credentials Sakina Hearn PT DPT   Total/Authorized Visits 6   Visits Used 5   Medical Diagnosis Acute bilateral low back pain without sciatica (M54.50)  - Primary   PT Tx Diagnosis Low back pain   Progress Note/Certification   Onset of illness/injury or Date of Surgery 07/21/23   Therapy Frequency 1x/week   Predicted Duration 6 weeks   PT Goal 1   Goal Identifier 1   Goal Description Pt will demonstrate appropriate lifting mechanics for 20# floor to counter for work duties   Rationale to maximize safety and independence within the home;to maximize safety and independence with performance of ADLs and functional tasks;to maximize safety and independence within the community;to maximize safety and independence with self cares   Target Date 09/21/23   Subjective Report   Subjective Report Pt notes that still needing to work more on mobility, wants additional streetching to target middle back.   Objective Measures   Objective Measures Objective Measure 1   Objective Measure 1   Objective Measure Rom   Details Flexion to floor, B hamstring tightness. SB equilvalent. no restristion extension   Treatment Interventions (PT)   Interventions Therapeutic Procedure/Exercise   Therapeutic Procedure/Exercise   Therapeutic Procedures: strength, endurance, ROM, flexibillity minutes (51884) 27   Ther Proc 1 HEP   Ther Proc 1 - Details Verbal review of strenth ex, duration, frequency, parameters and performance   Ther Proc 2 Management symptoms   Ther Proc 2 - Details Discuss continued management of symptoms - utilizing strengthening vs stretching   PTRx Ther Proc 9 Prone On Elbows   PTRx Ther Proc 9 - Details verbal  review   PTRx Ther Proc 10 Prone Press Ups   PTRx Ther Proc 10 - Details HEP   PTRx Ther Proc 11 Pretzel Stretch   PTRx Ther Proc 11 - Details HEP   PTRx Ther Proc 12 Piriformis Stretch Below 90 Degrees Supine   PTRx Ther Proc 12 - Details self assisted and w/ PT assist - more stretch w/ allowing hip roll   PTRx Ther Proc 13 Supine Lumbar Hip Roll   PTRx Ther Proc 13 - Details w/ crossover legs for more stretch   Skilled Intervention HEP, guided stretching/strengthening   Patient Response/Progress focus on mobility today   PTRx Ther Proc 14 Standing Passive Shoulder Flexion   PTRx Ther Proc 14 - Details forward and lateral walk   PTRx Ther Proc 15 Lumbar Flexion Rotation   PTRx Ther Proc 15 - Details PT assisted   PTRx Ther Proc 16 Seated Hamstring Stretch   PTRx Ther Proc 16 - Details x 20s holds   Therapeutic Activity   PTRx Ther Act 1 Education Sheet General   PTRx Ther Act 1 - Details No Notes   Plan   Home program ptrx   Updates to plan of care Continue HEP, recheck 1 month   Plan for next session Recheck mobility. add strength if needed: consider lunge w/ rotation, lateral step down, hamstring reach. Continue mobility work   Total Session Time   Timed Code Treatment Minutes 27   Total Treatment Time (sum of timed and untimed services) 27

## 2024-06-23 ENCOUNTER — HEALTH MAINTENANCE LETTER (OUTPATIENT)
Age: 29
End: 2024-06-23

## 2025-01-25 ENCOUNTER — HOSPITAL ENCOUNTER (EMERGENCY)
Facility: CLINIC | Age: 30
Discharge: HOME | End: 2025-01-25
Attending: PHYSICIAN ASSISTANT | Admitting: PHYSICIAN ASSISTANT
Payer: COMMERCIAL

## 2025-01-25 ENCOUNTER — APPOINTMENT (OUTPATIENT)
Dept: GENERAL RADIOLOGY | Facility: CLINIC | Age: 30
End: 2025-01-25
Attending: PHYSICIAN ASSISTANT
Payer: COMMERCIAL

## 2025-01-25 VITALS
TEMPERATURE: 98.6 F | HEART RATE: 70 BPM | DIASTOLIC BLOOD PRESSURE: 66 MMHG | SYSTOLIC BLOOD PRESSURE: 132 MMHG | RESPIRATION RATE: 18 BRPM | OXYGEN SATURATION: 94 %

## 2025-01-25 DIAGNOSIS — M25.561 RIGHT KNEE PAIN: ICD-10-CM

## 2025-01-25 PROCEDURE — 73562 X-RAY EXAM OF KNEE 3: CPT | Mod: RT

## 2025-01-25 PROCEDURE — G0463 HOSPITAL OUTPT CLINIC VISIT: HCPCS | Performed by: PHYSICIAN ASSISTANT

## 2025-01-25 PROCEDURE — 99213 OFFICE O/P EST LOW 20 MIN: CPT | Performed by: PHYSICIAN ASSISTANT

## 2025-01-25 ASSESSMENT — COLUMBIA-SUICIDE SEVERITY RATING SCALE - C-SSRS
6. HAVE YOU EVER DONE ANYTHING, STARTED TO DO ANYTHING, OR PREPARED TO DO ANYTHING TO END YOUR LIFE?: NO
2. HAVE YOU ACTUALLY HAD ANY THOUGHTS OF KILLING YOURSELF IN THE PAST MONTH?: NO
1. IN THE PAST MONTH, HAVE YOU WISHED YOU WERE DEAD OR WISHED YOU COULD GO TO SLEEP AND NOT WAKE UP?: NO

## 2025-01-25 ASSESSMENT — ACTIVITIES OF DAILY LIVING (ADL): ADLS_ACUITY_SCORE: 41

## 2025-01-25 NOTE — ED PROVIDER NOTES
History     Chief Complaint   Patient presents with    Knee Pain     HPI  Hector Rendon is a 29 year old male who presents to urgent care with concern over right knee/tibia pain after injury yesterday.  Patient slipped on the driveway and fell landing on the lateral aspect of his knee/lower leg.  He is unaware of any ecchymosis, lacerations or abrasions.  No distal numbness or paresthesias.  No obvious swelling.  No difficulty bearing weight.  he does however complain of significant pain with movement of the knee and decreased ROM.  He has not attempted any OTC treatments.      Allergies:  Allergies   Allergen Reactions    Keflex [Cephalexin Monohydrate]        Problem List:    Patient Active Problem List    Diagnosis Date Noted    Marijuana smoker 04/11/2013     Priority: Medium    Acute ulcerative gingivitis 08/07/2008     Priority: Medium     on left lower gum - herpes? send cx.  1st episode.        Family history of ischemic heart disease 08/07/2008     Priority: Medium      - check lipids      Bronchospasm 08/07/2008     Priority: Medium     episode waking him up - chest heaviness w exercise - methacholine challenge test - hold on sports for now - consider cotnroller med pending tests      Health Care Home 04/10/2013     Priority: Low     EMERGENCY CARE PLAN  April 10, 2013: No current Care Coordination follow up planned. Please refer if Care Coordination services are needed.    Presenting Problem Signs and Symptoms Treatment Plan   Questions or concerns   during clinic hours   I will call my clinic directly:  [address] [address] [phone].    Questions or concerns outside clinic hours   I will call the 24 hour nurse line at   [phone] or [facility].   Need to schedule an appointment   I will call the 24 hour scheduling team at [phone] or my clinic directly at [phone].    Same day treatment     I will call my clinic first, nurse line if after  hours, urgent care and express care if needed.     Clinic care coordination services (regular clinic hours)     I will call a clinic care coordinator directly:     Jensen Mae RN  Mon, Tues, Fri - [phone]  Wed, Thurs - [phone]    Montse Heath, :    [phone]    Or call my clinic at [phone] and ask to speak with care coordination.     Crisis Services: Behavioral or Mental Health  [facility] 24 Hour Phone Line  [phone]    [facility] 24 Hour Crisis Services  [phone]    [facility] ([facility]al [facility] Prov[facility] [phone]    [facility]   [phone]         Emergency treatment -- Immediately    [facility] [address]             Past Medical History:    No past medical history on file.    Past Surgical History:    Past Surgical History:   Procedure Laterality Date    DENTAL SURGERY      [place] teeth removal    Left little finger      Wood splitter accident       Family History:    Family History   Problem Relation Age of Onset    Hypertension Father     Cancer Maternal Grandmother         Lung    Cancer - colorectal Maternal Grandfather          70's    Heart Disease Maternal Grandfather          70's    Cerebrovascular Disease Paternal Grandmother         Lymphoma    Lipids Mother     Hypertension Mother     Heart Disease Other         MI at 44 yo.  mggf    Heart Disease Paternal Grandfather         74 yo heart       Social History:  Marital Status:  Single [1]  Social History     Tobacco Use    Smoking status: Every Day     Current packs/day: 0.25     Average packs/day: 0.3 packs/day for 5.0 years (1.3 ttl pk-yrs)     Types: Cigarettes    Smokeless tobacco: Never   Substance Use Topics    Alcohol use: No    Drug use: Yes     Comment: Marijuana        Medications:    cyclobenzaprine (FLEXERIL) 10 MG tablet  cyclobenzaprine (FLEXERIL) 10 MG tablet  methylPREDNISolone (MEDROL DOSEPAK) 4 MG tablet therapy pack  NO ACTIVE MEDICATIONS  predniSONE (DELTASONE) 20 MG  tablet      Review of Systems  CONSTITUTIONAL:NEGATIVE for fever, chills, change in weight  INTEGUMENTARY/SKIN: NEGATIVE for worrisome rashes, moles or lesions  RESP:NEGATIVE for significant cough or SOB  MUSCULOSKELETAL: POSITIVE  for right knee/proximal lower leg pain and NEGATIVE for other concerning arthralgias or myalgias   NEURO: NEGATIVE for numbness, paresthesias   Physical Exam   BP: 132/66  Pulse: 70  Temp: 98.6  F (37  C)  Resp: 18  SpO2: 94 %      Physical Exam  Constitutional:       General: He is not in acute distress.     Appearance: He is not ill-appearing or toxic-appearing.   HENT:      Head: Normocephalic and atraumatic.   Cardiovascular:      Pulses:           Dorsalis pedis pulses are 2+ on the right side.        Posterior tibial pulses are 2+ on the right side.   Musculoskeletal:      Right knee: No swelling, deformity, effusion, erythema, ecchymosis, lacerations or crepitus. Decreased range of motion. Tenderness present. No LCL laxity, MCL laxity or ACL laxity. Normal alignment, normal meniscus and normal patellar mobility.      Right lower leg: Tenderness (proximal lateral lower leg) present.   Skin:     General: Skin is warm and dry.      Findings: No abrasion, ecchymosis, erythema, laceration or rash.   Neurological:      Mental Status: He is alert.      Sensory: No sensory deficit.       ED Course        Procedures              Critical Care time:  none         Results for orders placed or performed during the hospital encounter of 01/25/25   XR Knee Right 3 Views     Status: None    Narrative    EXAM: XR KNEE RIGHT 3 VIEWS  LOCATION: [facility]  DATE: 1/25/2025    INDICATION: pain after fall yesterday  COMPARISON: None.      Impression    IMPRESSION: The right knee is negative for fracture or compartmental narrowing.       Medications - No data to display    Assessments & Plan (with Medical Decision Making)     I have reviewed the nursing notes.    I have  reviewed the findings, diagnosis, plan and need for follow up with the patient.       New Prescriptions    No medications on file       Final diagnoses:   Right knee pain     29-year-old male presents to urgent care with concern over right knee pain after he sustained a fall on the ice.  He had stable vital signs upon arrival.  Physical exam findings as described above are significant for decreased active range of motion of the knee secondary to discomfort, tenderness palpation of the knee and the proximal lateral right lower leg.  As part of evaluation he had x-ray which was negative for acute fracture, malalignment or compartmental narrowing per radiology report.  Differential for symptoms would favor soft tissue injury consider contusion, sprain/strain, low suspicion for occult fracture however cannot definitively rule out.  I have low suspicion for meniscal injury at this time as well.  He was discharged home stable with instructions for symptomatic treatment with rest, ice, OTC pain relievers.  Follow up with PCP or sports medicine if no improvement in 5-7 days. Worrisome reasons to return to ER/UC sooner discussed.     Disclaimer: This note consists of symbols derived from keyboarding, dictation, and/or voice recognition software. As a result, there may be errors in the script that have gone undetected.  Please consider this when interpreting information found in the chart.      1/25/2025   [facility] EMERGENCY DEPT       Jessi Razo PA-C  01/28/25 0937

## 2025-01-25 NOTE — Clinical Note
Hector Rendon was seen and treated in our emergency department on 1/25/2025.    I recommend that he rest the right knee with limited activity only as tolerated pain for the next week or until his next follow-up appointment.  During this time he may need to avoid any kneeling, squatting, twisting of the knee, heavy lifting greater than 30 pounds or other activities that exacerbate his pain.       Sincerely,     [facility] Emergency Dept

## 2025-07-12 ENCOUNTER — HEALTH MAINTENANCE LETTER (OUTPATIENT)
Age: 30
End: 2025-07-12